# Patient Record
(demographics unavailable — no encounter records)

---

## 2025-03-20 NOTE — PHYSICAL EXAM

## 2025-03-20 NOTE — REASON FOR VISIT
[FreeTextEntry1] : 55-year-old male with developmental disability comes in for routine follow-up.  Overall is feeling well denies chest pain shortness of breath PND orthopnea.

## 2025-03-20 NOTE — DISCUSSION/SUMMARY
[FreeTextEntry1] : 1.  Cardiac murmur: EKG and echocardiogram. [EKG obtained to assist in diagnosis and management of assessed problem(s)] : EKG obtained to assist in diagnosis and management of assessed problem(s)